# Patient Record
(demographics unavailable — no encounter records)

---

## 2025-03-14 NOTE — ASSESSMENT
[FreeTextEntry1] : 75 y/o male with history as above  CAD C/w secondary prevention Chest discomfort - schedule stress echo C/w ASA  DL C/w Repatha Repeat labs - obtain from Dr. Knight's office  Pre-DM Low carbohydrate diet Monitor A1c  Mild AI F/u echo  Patient was advised about healthy lifestyle changes, including diet and exercise. Importance of sustained long-term weight loss was discussed, questions answered.  return after the test

## 2025-03-14 NOTE — HISTORY OF PRESENT ILLNESS
[FreeTextEntry1] : 75 y/o male with history of DL, pre-DM, CAD, s/p PCI x 2, presenting for evaluation. Patient reports episodes of chest discomfort, non-exertional, non-radiating, left-sided and mid-sternal. No syncope. No other associated symptoms. No palpitations. No recent ischemic w/u. BP is mildly elevated today, he reports normal BP at home.  Compliant with Repatha. had labs with Dr. Knight.   Echo: Stress ECHO 08/16/22: 8.9 METs, no ischemia LVEF 62%, Mild TR, AI.

## 2025-04-28 NOTE — PHYSICAL EXAM
[de-identified] : Physical exam shows the patient to be alert and oriented x3, capable of ambulation. The patient is well-developed and well-nourished in no apparent respiratory distress. Majority of the skin is intact bilaterally in the upper extremities without lymphadenopathy at the elbows.  There is a negative Spurling test. There is no sensitivity or Tinel's over the axilla bilaterally in the area of the brachial plexus.  The elbows have a symmetric and full range of motion with no pain upon forced flexion or extension bilaterally. There is no tenderness over the medial or lateral epicondyles bilaterally. The biceps and triceps are intact bilaterally with 5 over 5 strength. There is no joint effusion or instability of the medial and lateral collateral ligament complex bilaterally. There is no sensitivity of the median ulnar or radial nerves with provocative tests bilaterally.  The wrists have a symmetric range of motion bilaterally. There is no tenderness over the scaphoid, scapholunate or lunotriquetral ligaments bilaterally. There is a negative Pace test bilaterally. There is negative tenderness over the radial ulnar joint or TFCC and no evidence of instability bilaterally. No tenderness over the pisotriquetral or hamate hook or CMC joint bilaterally. There is 5 over 5 strength of the wrists bilaterally.  There is a negative Finkelstein test bilaterally and no tenderness over the A1 pulleys. There is no tenderness or instability of the MP PIP or DIP joints in the digits bilaterally with no evidence of digital malrotation.  There is no sensitivity or masses around the ulnar nerve at the level of the wrist bilaterally.   There is 2+ pulses over the radial arteries bilaterally with good capillary refill.  There is a positive Tinel's and a positive Phalen's test at 15 seconds on the right. There is also thenar atrophy but good opposition is present. The remaining intrinsic musculature has good strength bilaterally.  Sensation is diminished in the median nerve distribution on the affected side. Ulnar nerve sensation is grossly intact

## 2025-04-28 NOTE — ASSESSMENT
[FreeTextEntry1] : Status post open left carpal tunnel release and trigger finger releases doing well.  Patient has a chronic right carpal tunnel syndrome progressively getting worse over time and has not responded despite rest activity modifications and splinting.  Options were discussed ranging conservative management, therapy, splinting, injection, or surgery.  Surgery discussed with endoscopic less likely open right carpal tunnel release.  The risks, benefits, alternatives and associated differential diagnosis was discussed with the patient. Options ranged from conservative care, therapy to surgical intervention were reviewed and all questions answered. Risks included incomplete resolution of symptoms, worsening of symptoms recurrence, tissue loss, functional loss and other risks associated with treatment of this condition Patient appeared to have an excellent understanding of the risks as well as differential diagnosis associated with this condition.  Patient would like to proceed with endoscopic less likely open right carpal tunnel release

## 2025-04-28 NOTE — HISTORY OF PRESENT ILLNESS
[Right] : right hand dominant [FreeTextEntry1] : Patient returns for follow-up of right hand carpal tunnel syndrome with no previous injections.  He is 1 year status post left carpal tunnel release and left 2nd and 3rd trigger finger releases. DOS-5/2/24

## 2025-04-28 NOTE — PHYSICAL EXAM
[de-identified] : Physical exam shows the patient to be alert and oriented x3, capable of ambulation. The patient is well-developed and well-nourished in no apparent respiratory distress. Majority of the skin is intact bilaterally in the upper extremities without lymphadenopathy at the elbows.  There is a negative Spurling test. There is no sensitivity or Tinel's over the axilla bilaterally in the area of the brachial plexus.  The elbows have a symmetric and full range of motion with no pain upon forced flexion or extension bilaterally. There is no tenderness over the medial or lateral epicondyles bilaterally. The biceps and triceps are intact bilaterally with 5 over 5 strength. There is no joint effusion or instability of the medial and lateral collateral ligament complex bilaterally. There is no sensitivity of the median ulnar or radial nerves with provocative tests bilaterally.  The wrists have a symmetric range of motion bilaterally. There is no tenderness over the scaphoid, scapholunate or lunotriquetral ligaments bilaterally. There is a negative Pace test bilaterally. There is negative tenderness over the radial ulnar joint or TFCC and no evidence of instability bilaterally. No tenderness over the pisotriquetral or hamate hook or CMC joint bilaterally. There is 5 over 5 strength of the wrists bilaterally.  There is a negative Finkelstein test bilaterally and no tenderness over the A1 pulleys. There is no tenderness or instability of the MP PIP or DIP joints in the digits bilaterally with no evidence of digital malrotation.  There is no sensitivity or masses around the ulnar nerve at the level of the wrist bilaterally.   There is 2+ pulses over the radial arteries bilaterally with good capillary refill.  There is a positive Tinel's and a positive Phalen's test at 15 seconds on the right. There is also thenar atrophy but good opposition is present. The remaining intrinsic musculature has good strength bilaterally.  Sensation is diminished in the median nerve distribution on the affected side. Ulnar nerve sensation is grossly intact

## 2025-04-30 NOTE — HISTORY OF PRESENT ILLNESS
[FreeTextEntry1] : 77 y/o male with history of DL, pre-DM, CAD, s/p PCI x 2, presenting for evaluation. Patient reports episodes of chest discomfort, non-exertional, non-radiating, left-sided and mid-sternal. No syncope. No other associated symptoms. No palpitations. No recent ischemic w/u. BP is mildly elevated today, he reports normal BP at home.  Compliant with Repatha. had labs with Dr. Knight. Stress echo today is negative for ischemia. Low level of exertion, stopped due to knee pain.   Echo: Stress ECHO 08/16/22: 8.9 METs, no ischemia LVEF 62%, Mild TR, AI. 4/30/25 - Stress echo negative. 4.6 METs

## 2025-04-30 NOTE — ASSESSMENT
[FreeTextEntry1] : 75 y/o male with history as above  CAD C/w secondary prevention Chest discomfort - negative stress echo C/w ASA C/w medical therapy, secondary prevention  DL C/w Repatha Repeat labs - obtain from Dr. Knight's office  Pre-DM Low carbohydrate diet Monitor A1c  Mild AI F/u echo - stable. No intervention needed. Discussed with the patient.  Patient was advised about healthy lifestyle changes, including diet and exercise. Importance of sustained long-term weight loss was discussed, questions answered.  return in 6 months if stable

## 2025-06-13 NOTE — HISTORY OF PRESENT ILLNESS
[___ Days Post Op] : post op day #[unfilled] [de-identified] : DOS: 6/4/25 [de-identified] : 9 days status post endoscopic right carpal tunnel release [de-identified] : Incision line is clean, dry, and intact.  No evidence of infection.  Negative Tinel's.  Good APB strength.  Patient moving all digits well.  Patient's neurovascular grossly intact. [de-identified] : 9 days status post endoscopic right carpal tunnel release [de-identified] : Sutures were removed Home program outlined to reduce the risk of complications Referral to outside occupational therapy Return to the office in 6 weeks

## 2025-06-13 NOTE — HISTORY OF PRESENT ILLNESS
[___ Days Post Op] : post op day #[unfilled] [de-identified] : DOS: 6/4/25 [de-identified] : 9 days status post endoscopic right carpal tunnel release [de-identified] : Incision line is clean, dry, and intact.  No evidence of infection.  Negative Tinel's.  Good APB strength.  Patient moving all digits well.  Patient's neurovascular grossly intact. [de-identified] : 9 days status post endoscopic right carpal tunnel release [de-identified] : Sutures were removed Home program outlined to reduce the risk of complications Referral to outside occupational therapy Return to the office in 6 weeks